# Patient Record
(demographics unavailable — no encounter records)

---

## 2025-06-21 NOTE — HISTORY OF PRESENT ILLNESS
[de-identified] : 52 yo m 1 month and 1 week ago sneezed forcefully at 2 am and then went to sleep and woke up 10 am and noticed high r frequency tinnitus and that sounds are too loud after that - he has been to the hospital twice - felt like "a balloon was in his head" - he was prescribed medrol dosepack - it helped mildly.  Went to 2 a second in ER -[facility] and [facility].were the 2 er's. no vertigo no trouble with ears in the past. had a uri when it started. nonsmoker. no fh no ear trouble in the past. He states he has all of his records but did not bring any. He currently c/o b high pitched tinnitus continuous.

## 2025-06-21 NOTE — HISTORY OF PRESENT ILLNESS
[de-identified] : 50 yo m 1 month and 1 week ago sneezed forcefully at 2 am and then went to sleep and woke up 10 am and noticed high r frequency tinnitus and that sounds are too loud after that - he has been to the hospital twice - felt like "a balloon was in his head" - he was prescribed medrol dosepack - it helped mildly.  Went to 2 a second in ER -[facility] and [facility].were the 2 er's. no vertigo no trouble with ears in the past. had a uri when it started. nonsmoker. no fh no ear trouble in the past. He states he has all of his records but did not bring any. He currently c/o b high pitched tinnitus continuous.

## 2025-06-21 NOTE — ASSESSMENT
[FreeTextEntry1] : Explained his ce shows b mild hf snhl which can cause tinnitus rec folllow up with records - he said he would discussed options - leave alone, inc bckgd noise, maskers, trt - given information sheet, encouraged trt rtc with records